# Patient Record
Sex: FEMALE | Race: WHITE | NOT HISPANIC OR LATINO
[De-identification: names, ages, dates, MRNs, and addresses within clinical notes are randomized per-mention and may not be internally consistent; named-entity substitution may affect disease eponyms.]

---

## 2020-07-07 PROBLEM — Z00.00 ENCOUNTER FOR PREVENTIVE HEALTH EXAMINATION: Status: ACTIVE | Noted: 2020-07-07

## 2020-07-10 ENCOUNTER — APPOINTMENT (OUTPATIENT)
Dept: CARDIOLOGY | Facility: CLINIC | Age: 65
End: 2020-07-10
Payer: MEDICARE

## 2020-07-10 ENCOUNTER — APPOINTMENT (OUTPATIENT)
Dept: CARDIOTHORACIC SURGERY | Facility: CLINIC | Age: 65
End: 2020-07-10
Payer: MEDICARE

## 2020-07-10 VITALS
OXYGEN SATURATION: 96 % | WEIGHT: 200 LBS | DIASTOLIC BLOOD PRESSURE: 62 MMHG | HEIGHT: 64 IN | RESPIRATION RATE: 13 BRPM | BODY MASS INDEX: 34.15 KG/M2 | SYSTOLIC BLOOD PRESSURE: 123 MMHG | TEMPERATURE: 98.8 F | HEART RATE: 97 BPM

## 2020-07-10 VITALS
SYSTOLIC BLOOD PRESSURE: 123 MMHG | BODY MASS INDEX: 34.15 KG/M2 | OXYGEN SATURATION: 96 % | HEART RATE: 97 BPM | TEMPERATURE: 98.8 F | DIASTOLIC BLOOD PRESSURE: 62 MMHG | WEIGHT: 200 LBS | HEIGHT: 64 IN | RESPIRATION RATE: 13 BRPM

## 2020-07-10 DIAGNOSIS — Z82.49 FAMILY HISTORY OF ISCHEMIC HEART DISEASE AND OTHER DISEASES OF THE CIRCULATORY SYSTEM: ICD-10-CM

## 2020-07-10 DIAGNOSIS — Z87.891 PERSONAL HISTORY OF NICOTINE DEPENDENCE: ICD-10-CM

## 2020-07-10 DIAGNOSIS — Z82.3 FAMILY HISTORY OF STROKE: ICD-10-CM

## 2020-07-10 DIAGNOSIS — Z80.8 FAMILY HISTORY OF MALIGNANT NEOPLASM OF OTHER ORGANS OR SYSTEMS: ICD-10-CM

## 2020-07-10 DIAGNOSIS — Z78.9 OTHER SPECIFIED HEALTH STATUS: ICD-10-CM

## 2020-07-10 PROCEDURE — 99204 OFFICE O/P NEW MOD 45 MIN: CPT

## 2020-07-10 NOTE — ASSESSMENT
[FreeTextEntry1] : Ms. ALIRIO GUY 65 year F no significant medical history, PSxH of cholecystecomy, CA of the lower eyelid excision, presents to our office today for evaluation of aortic stenosis referred to our office by Dr. Montemayor. Diagnostic testing reviewed. \par \par Plan: CT angio of heart with IV contrast TAVR protocol\par          PFT\par \par I, Moni Babcock, MARSHAL-BC, am acting as scribe for Dr. Milan.\par \par will determine if bicuspid or tricuspid valve.and determine candidacy for TAVR on CTA.\par

## 2020-07-10 NOTE — PHYSICAL EXAM
[Normal Appearance] : normal appearance [Well Groomed] : well groomed [General Appearance - Well Nourished] : well nourished [General Appearance - Well Developed] : well developed [No Deformities] : no deformities [General Appearance - Alert] : alert [General Appearance - In No Acute Distress] : in no acute distress [] : no respiratory distress [Heart Rate And Rhythm] : heart rate was normal and rhythm regular [Auscultation Breath Sounds / Voice Sounds] : lungs were clear to auscultation bilaterally [Systolic grade ___/6] : A grade [unfilled]/6 systolic murmur was heard. [Abnormal Walk] : normal gait [Motor Tone] : muscle strength and tone were normal [Nail Clubbing] : no clubbing  or cyanosis of the fingernails [Musculoskeletal - Swelling] : no joint swelling seen [Sensation] : the sensory exam was normal to light touch and pinprick [No Focal Deficits] : no focal deficits [Deep Tendon Reflexes (DTR)] : deep tendon reflexes were 2+ and symmetric [Affect] : the affect was normal [Impaired Insight] : insight and judgment were intact [Oriented To Time, Place, And Person] : oriented to person, place, and time

## 2020-07-10 NOTE — PHYSICAL EXAM
[General Appearance - Alert] : alert [General Appearance - In No Acute Distress] : in no acute distress [] : no respiratory distress [Auscultation Breath Sounds / Voice Sounds] : lungs were clear to auscultation bilaterally [Motor Tone] : muscle strength and tone were normal [Musculoskeletal - Swelling] : no joint swelling seen [Abnormal Walk] : normal gait [Nail Clubbing] : no clubbing  or cyanosis of the fingernails [Deep Tendon Reflexes (DTR)] : deep tendon reflexes were 2+ and symmetric [Sensation] : the sensory exam was normal to light touch and pinprick [No Focal Deficits] : no focal deficits [Impaired Insight] : insight and judgment were intact [Oriented To Time, Place, And Person] : oriented to person, place, and time [Affect] : the affect was normal [Heart Rate And Rhythm] : heart rate was normal and rhythm regular [Systolic grade ___/6] : A grade [unfilled]/6 systolic murmur was heard.

## 2020-07-17 NOTE — HISTORY OF PRESENT ILLNESS
[Dyslipidemia] : Dyslipidemia [FreeTextEntry1] : Ms. ALIRIO GUY 65 year F no significant medical history, PSxH of cholecystecomy, CA of the lower eyelid excision, presents to our office today for evaluation of aortic stenosis referred to our office by Dr. Montemayor. She endorse SOB worsening over the past 1 yr, most notably during the past 4 months. She reports the SOB is occasional, and she denies fatigue. She reports dizziness when she lays flat and uses 3 pillows to sit up to watch TV, but does not require the 3 pillows to sleep. She lives at home with her  and is independent in her ADLs. She is a retired [employer] , and most recently a retired [employer] employee. \par \par \par Her healthcare team is as follows:\par PMD: Maggi Chen\par Cardio: Heidi Montemayor\par  [Diabetes Mellitus] : no Diabetes Melllitus [Dialysis] : no dialysis [Hypertension] : no hypertension [Infectious Endocarditis] : no infectious endocarditis [Inhaled Medication Therapy] : no inhaled medication therapy [Home Oxygen] : no home oxygen use [Liver Disease] : no liver disease [Sleep Apnea] : no sleep apnea [Immunocompromise Present] : not immunocompromised [Peripheral Artery Disease] : no peripheral artery disease [Unresponsive Neurologic State] : not in a unresponsive neurologic state [Syncope] : no syncope [Cerebrovascular Disease] : no cerebrovascular disease [Prior Myocardial Infarction] : No prior myocardial infarction [CVD TIA] : no CVD Tia [Prior CVA] : with no prior CVA [Prior Heart Failure] : no prior heart failure

## 2020-07-17 NOTE — END OF VISIT
[FreeTextEntry3] : Seen / examined and above reviewed.\par \par Severe symptomatic aortic stenosis.\par Progressive exertional dyspnea.\par \par Records reviewed:\par CATH: No CAD\par ECHO: nL LVSF.  Mod AI.  Sev AS.\par \par Will obtain TAVR protocol CTA and further evaluate for TAVR.  Patient likely low-risk AVR.  If high risk anatomy on CTA, will consider surgery.  Otherwise, plan for expeditious TAVR.

## 2020-07-17 NOTE — ASSESSMENT
[FreeTextEntry1] : Ms. ALIRIO GUY 65 year F no significant medical history, PSxH of cholecystecomy, CA of the lower eyelid excision, presents to our office today for evaluation of aortic stenosis referred to our office by Dr. Montemayor. Diagnostic testing reviewed. \par \par Plan: CT angio of heart with IV contrast TAVR protocol\par          PFT\par          patient will get dental clearance with Dr. Myles\par \par \par

## 2020-07-28 ENCOUNTER — RESULT REVIEW (OUTPATIENT)
Age: 65
End: 2020-07-28

## 2020-07-28 ENCOUNTER — OUTPATIENT (OUTPATIENT)
Dept: OUTPATIENT SERVICES | Facility: HOSPITAL | Age: 65
LOS: 1 days | Discharge: HOME | End: 2020-07-28
Payer: MEDICARE

## 2020-07-28 DIAGNOSIS — I35.0 NONRHEUMATIC AORTIC (VALVE) STENOSIS: ICD-10-CM

## 2020-07-28 PROCEDURE — 75574 CT ANGIO HRT W/3D IMAGE: CPT | Mod: 26

## 2020-07-28 PROCEDURE — 93880 EXTRACRANIAL BILAT STUDY: CPT | Mod: 26

## 2020-07-28 PROCEDURE — 74174 CTA ABD&PLVS W/CONTRAST: CPT | Mod: 26

## 2020-07-29 ENCOUNTER — RESULT REVIEW (OUTPATIENT)
Age: 65
End: 2020-07-29

## 2020-07-29 ENCOUNTER — OUTPATIENT (OUTPATIENT)
Dept: OUTPATIENT SERVICES | Facility: HOSPITAL | Age: 65
LOS: 1 days | Discharge: HOME | End: 2020-07-29
Payer: MEDICARE

## 2020-07-29 ENCOUNTER — LABORATORY RESULT (OUTPATIENT)
Age: 65
End: 2020-07-29

## 2020-07-29 ENCOUNTER — OUTPATIENT (OUTPATIENT)
Dept: OUTPATIENT SERVICES | Facility: HOSPITAL | Age: 65
LOS: 1 days | Discharge: HOME | End: 2020-07-29

## 2020-07-29 VITALS
HEART RATE: 96 BPM | RESPIRATION RATE: 16 BRPM | SYSTOLIC BLOOD PRESSURE: 140 MMHG | OXYGEN SATURATION: 97 % | DIASTOLIC BLOOD PRESSURE: 83 MMHG | TEMPERATURE: 98 F | HEIGHT: 64 IN | WEIGHT: 210.98 LBS

## 2020-07-29 DIAGNOSIS — Z01.818 ENCOUNTER FOR OTHER PREPROCEDURAL EXAMINATION: ICD-10-CM

## 2020-07-29 DIAGNOSIS — I10 ESSENTIAL (PRIMARY) HYPERTENSION: ICD-10-CM

## 2020-07-29 DIAGNOSIS — Z98.890 OTHER SPECIFIED POSTPROCEDURAL STATES: Chronic | ICD-10-CM

## 2020-07-29 DIAGNOSIS — Z90.49 ACQUIRED ABSENCE OF OTHER SPECIFIED PARTS OF DIGESTIVE TRACT: Chronic | ICD-10-CM

## 2020-07-29 DIAGNOSIS — E66.9 OBESITY, UNSPECIFIED: ICD-10-CM

## 2020-07-29 DIAGNOSIS — Z11.59 ENCOUNTER FOR SCREENING FOR OTHER VIRAL DISEASES: ICD-10-CM

## 2020-07-29 DIAGNOSIS — I35.0 NONRHEUMATIC AORTIC (VALVE) STENOSIS: ICD-10-CM

## 2020-07-29 LAB
ALBUMIN SERPL ELPH-MCNC: 4.7 G/DL — SIGNIFICANT CHANGE UP (ref 3.5–5.2)
ALP SERPL-CCNC: 85 U/L — SIGNIFICANT CHANGE UP (ref 30–115)
ALT FLD-CCNC: 14 U/L — SIGNIFICANT CHANGE UP (ref 0–41)
ANION GAP SERPL CALC-SCNC: 14 MMOL/L — SIGNIFICANT CHANGE UP (ref 7–14)
APPEARANCE UR: CLEAR — SIGNIFICANT CHANGE UP
APTT BLD: 30.7 SEC — SIGNIFICANT CHANGE UP (ref 27–39.2)
AST SERPL-CCNC: 16 U/L — SIGNIFICANT CHANGE UP (ref 0–41)
BACTERIA # UR AUTO: NEGATIVE — SIGNIFICANT CHANGE UP
BASOPHILS # BLD AUTO: 0.02 K/UL — SIGNIFICANT CHANGE UP (ref 0–0.2)
BASOPHILS NFR BLD AUTO: 0.2 % — SIGNIFICANT CHANGE UP (ref 0–1)
BILIRUB SERPL-MCNC: 0.2 MG/DL — SIGNIFICANT CHANGE UP (ref 0.2–1.2)
BILIRUB UR-MCNC: NEGATIVE — SIGNIFICANT CHANGE UP
BLD GP AB SCN SERPL QL: SIGNIFICANT CHANGE UP
BUN SERPL-MCNC: 16 MG/DL — SIGNIFICANT CHANGE UP (ref 10–20)
CALCIUM SERPL-MCNC: 9.6 MG/DL — SIGNIFICANT CHANGE UP (ref 8.5–10.1)
CHLORIDE SERPL-SCNC: 101 MMOL/L — SIGNIFICANT CHANGE UP (ref 98–110)
CO2 SERPL-SCNC: 26 MMOL/L — SIGNIFICANT CHANGE UP (ref 17–32)
COLOR SPEC: SIGNIFICANT CHANGE UP
CREAT SERPL-MCNC: 1 MG/DL — SIGNIFICANT CHANGE UP (ref 0.7–1.5)
DIFF PNL FLD: ABNORMAL
EOSINOPHIL # BLD AUTO: 0.19 K/UL — SIGNIFICANT CHANGE UP (ref 0–0.7)
EOSINOPHIL NFR BLD AUTO: 2.1 % — SIGNIFICANT CHANGE UP (ref 0–8)
EPI CELLS # UR: 3 /HPF — SIGNIFICANT CHANGE UP (ref 0–5)
GLUCOSE SERPL-MCNC: 85 MG/DL — SIGNIFICANT CHANGE UP (ref 70–99)
GLUCOSE UR QL: NEGATIVE — SIGNIFICANT CHANGE UP
HCT VFR BLD CALC: 36.7 % — LOW (ref 37–47)
HGB BLD-MCNC: 11.5 G/DL — LOW (ref 12–16)
HYALINE CASTS # UR AUTO: 1 /LPF — SIGNIFICANT CHANGE UP (ref 0–7)
IMM GRANULOCYTES NFR BLD AUTO: 0.3 % — SIGNIFICANT CHANGE UP (ref 0.1–0.3)
INR BLD: 0.98 RATIO — SIGNIFICANT CHANGE UP (ref 0.65–1.3)
KETONES UR-MCNC: NEGATIVE — SIGNIFICANT CHANGE UP
LEUKOCYTE ESTERASE UR-ACNC: NEGATIVE — SIGNIFICANT CHANGE UP
LYMPHOCYTES # BLD AUTO: 1.93 K/UL — SIGNIFICANT CHANGE UP (ref 1.2–3.4)
LYMPHOCYTES # BLD AUTO: 20.8 % — SIGNIFICANT CHANGE UP (ref 20.5–51.1)
MCHC RBC-ENTMCNC: 27.6 PG — SIGNIFICANT CHANGE UP (ref 27–31)
MCHC RBC-ENTMCNC: 31.3 G/DL — LOW (ref 32–37)
MCV RBC AUTO: 88 FL — SIGNIFICANT CHANGE UP (ref 81–99)
MONOCYTES # BLD AUTO: 0.75 K/UL — HIGH (ref 0.1–0.6)
MONOCYTES NFR BLD AUTO: 8.1 % — SIGNIFICANT CHANGE UP (ref 1.7–9.3)
MRSA PCR RESULT.: NEGATIVE — SIGNIFICANT CHANGE UP
NEUTROPHILS # BLD AUTO: 6.34 K/UL — SIGNIFICANT CHANGE UP (ref 1.4–6.5)
NEUTROPHILS NFR BLD AUTO: 68.5 % — SIGNIFICANT CHANGE UP (ref 42.2–75.2)
NITRITE UR-MCNC: NEGATIVE — SIGNIFICANT CHANGE UP
NRBC # BLD: 0 /100 WBCS — SIGNIFICANT CHANGE UP (ref 0–0)
NT-PROBNP SERPL-SCNC: 605 PG/ML — HIGH (ref 0–300)
PH UR: 6 — SIGNIFICANT CHANGE UP (ref 5–8)
PLATELET # BLD AUTO: 283 K/UL — SIGNIFICANT CHANGE UP (ref 130–400)
POTASSIUM SERPL-MCNC: 4.7 MMOL/L — SIGNIFICANT CHANGE UP (ref 3.5–5)
POTASSIUM SERPL-SCNC: 4.7 MMOL/L — SIGNIFICANT CHANGE UP (ref 3.5–5)
PROT SERPL-MCNC: 7.2 G/DL — SIGNIFICANT CHANGE UP (ref 6–8)
PROT UR-MCNC: NEGATIVE — SIGNIFICANT CHANGE UP
PROTHROM AB SERPL-ACNC: 11.3 SEC — SIGNIFICANT CHANGE UP (ref 9.95–12.87)
RBC # BLD: 4.17 M/UL — LOW (ref 4.2–5.4)
RBC # FLD: 15.5 % — HIGH (ref 11.5–14.5)
RBC CASTS # UR COMP ASSIST: 12 /HPF — HIGH (ref 0–4)
SODIUM SERPL-SCNC: 141 MMOL/L — SIGNIFICANT CHANGE UP (ref 135–146)
SP GR SPEC: 1.02 — SIGNIFICANT CHANGE UP (ref 1.01–1.02)
UROBILINOGEN FLD QL: SIGNIFICANT CHANGE UP
WBC # BLD: 9.26 K/UL — SIGNIFICANT CHANGE UP (ref 4.8–10.8)
WBC # FLD AUTO: 9.26 K/UL — SIGNIFICANT CHANGE UP (ref 4.8–10.8)
WBC UR QL: 3 /HPF — SIGNIFICANT CHANGE UP (ref 0–5)

## 2020-07-29 PROCEDURE — 71046 X-RAY EXAM CHEST 2 VIEWS: CPT | Mod: 26

## 2020-07-29 PROCEDURE — 93010 ELECTROCARDIOGRAM REPORT: CPT

## 2020-07-29 NOTE — H&P PST ADULT - HISTORY OF PRESENT ILLNESS
Pt states she went in for annual visit with her cardiologist and had an echo done where it was discovered that she had a "leaky" valve. Denies any chest pain, difficulty breathing, SOB, palpitations, dysuria, URI, or any other infections in the last 2 weeks. Denies any recent travel, contact, or exposure to any persons with known or suspected COVID-19. Pt also denies COVID testing within the last 2 weeks. Pt advised to self quarantine until day of procedure. Exercise tolerance of 1 flight of stairs without dyspnea. REJI reviewed with patient.     Anesthesia Alert  YES--Difficult Airway: Class IV  NO--History of neck surgery or radiation  NO--Limited ROM of neck  NO--History of Malignant hyperthermia  NO--No personal or family history of Pseudocholinesterase deficiency.  NO--Prior Anesthesia Complication  NO--Latex Allergy  YES--Loose teeth (bottom front)  NO--History of Rheumatoid Arthritis  NO--REJI  NO--Other_____

## 2020-07-29 NOTE — H&P PST ADULT - REASON FOR ADMISSION
66 yo female presents for PAST in preparation for transcatheter aortic valve replacement, right and left cardiac catheterization, transvenous pacer intra aortic balloon pump, peripheral angiogram angioplasty, possible emergent surgery all related procedures on 8/5/2020.

## 2020-07-29 NOTE — H&P PST ADULT - ABILITY TO HEAR (WITH HEARING AID OR HEARING APPLIANCE IF NORMALLY USED):
HDL 69   Dietary changes and follow up as outpatient      Adequate: hears normal conversation without difficulty

## 2020-07-30 LAB
A1C WITH ESTIMATED AVERAGE GLUCOSE RESULT: 5.4 % — SIGNIFICANT CHANGE UP (ref 4–5.6)
ESTIMATED AVERAGE GLUCOSE: 108 MG/DL — SIGNIFICANT CHANGE UP (ref 68–114)

## 2020-07-31 ENCOUNTER — APPOINTMENT (OUTPATIENT)
Dept: CARDIOTHORACIC SURGERY | Facility: CLINIC | Age: 65
End: 2020-07-31
Payer: MEDICARE

## 2020-07-31 ENCOUNTER — OUTPATIENT (OUTPATIENT)
Dept: OUTPATIENT SERVICES | Facility: HOSPITAL | Age: 65
LOS: 1 days | Discharge: HOME | End: 2020-07-31

## 2020-07-31 ENCOUNTER — APPOINTMENT (OUTPATIENT)
Dept: CARDIOLOGY | Facility: CLINIC | Age: 65
End: 2020-07-31
Payer: MEDICARE

## 2020-07-31 VITALS
HEIGHT: 64 IN | SYSTOLIC BLOOD PRESSURE: 135 MMHG | HEART RATE: 85 BPM | OXYGEN SATURATION: 97 % | WEIGHT: 210 LBS | DIASTOLIC BLOOD PRESSURE: 56 MMHG | RESPIRATION RATE: 13 BRPM | BODY MASS INDEX: 35.85 KG/M2 | TEMPERATURE: 98.4 F

## 2020-07-31 DIAGNOSIS — Z71.2 PERSON CONSULTING FOR EXPLANATION OF EXAMINATION OR TEST FINDINGS: ICD-10-CM

## 2020-07-31 DIAGNOSIS — I35.9 NONRHEUMATIC AORTIC VALVE DISORDER, UNSPECIFIED: ICD-10-CM

## 2020-07-31 DIAGNOSIS — R06.02 SHORTNESS OF BREATH: ICD-10-CM

## 2020-07-31 DIAGNOSIS — Z90.49 ACQUIRED ABSENCE OF OTHER SPECIFIED PARTS OF DIGESTIVE TRACT: Chronic | ICD-10-CM

## 2020-07-31 DIAGNOSIS — Z98.890 OTHER SPECIFIED POSTPROCEDURAL STATES: Chronic | ICD-10-CM

## 2020-07-31 PROCEDURE — 99214 OFFICE O/P EST MOD 30 MIN: CPT

## 2020-07-31 NOTE — DATA REVIEWED
[FreeTextEntry1] : 5 meter walk - 7/10/20\par 5.28\par 6.17\par 5.45\par AV.63\par \par ============================================================================\par \par \par

## 2020-07-31 NOTE — END OF VISIT
[FreeTextEntry3] : Seen / examined and above reviewed.\par Clinically stable.\par CTA reviewed.  Tricuspid valve with favorable anatomy for TF TAVR.\par Procedure discussed.  Informed consent obtained.  Scheduled for next week.\par 25-minute encounter.

## 2020-07-31 NOTE — REASON FOR VISIT
[Follow-Up: _____] : a [unfilled] follow-up visit [Consultation] : a consultation regarding [Aortic Stenosis] : aortic stenosis [FreeTextEntry1] : Aortic stenosis procedure planning

## 2020-07-31 NOTE — HISTORY OF PRESENT ILLNESS
[Dyslipidemia] : Dyslipidemia [FreeTextEntry1] : Ms. ALIRIO GUY 65 year F no significant medical history, PSxH of cholecystecomy, CA of the lower eyelid excision, presents to our office today for evaluation of aortic stenosis referred to our office by Dr. Montemayor. She endorse SOB worsening over the past 1 yr, most notably during the past 4 months. She reports the SOB is occasional, and she denies fatigue. She reports dizziness when she lays flat and uses 3 pillows to sit up to watch TV, but does not require the 3 pillows to sleep. She lives at home with her  and is independent in her ADLs. She is a retired [employer] , and most recently a retired [employer] employee. \par \par \par Her healthcare team is as follows:\par PMD: Maggi Chen\par Cardio: Heidi Montemayor\par  [Diabetes Mellitus] : no Diabetes Melllitus [Infectious Endocarditis] : no infectious endocarditis [Dialysis] : no dialysis [Hypertension] : no hypertension [Home Oxygen] : no home oxygen use [Inhaled Medication Therapy] : no inhaled medication therapy [Liver Disease] : no liver disease [Sleep Apnea] : no sleep apnea [Peripheral Artery Disease] : no peripheral artery disease [Immunocompromise Present] : not immunocompromised [Unresponsive Neurologic State] : not in a unresponsive neurologic state [Cerebrovascular Disease] : no cerebrovascular disease [Syncope] : no syncope [Prior CVA] : with no prior CVA [CVD TIA] : no CVD Tia [Prior Myocardial Infarction] : No prior myocardial infarction [Prior Heart Failure] : no prior heart failure

## 2020-07-31 NOTE — ASSESSMENT
[FreeTextEntry1] : Ms. ALIRIO GUY 65 year F no significant medical history, PSxH of cholecystecomy, CA of the lower eyelid excision, presents to our office today for evaluation of aortic stenosis referred to our office by Dr. Montemayor. She endorse SOB worsening over the past 1 yr, most notably during the past 4 months. Diagnostic testing reviewed. \par \par Plan: Discuss with Dr. Barney for final planning\par \par I, Moni Babcock, MARY-BC, am acting as scribe for Dr. Milan.\par  will review CT w heart team and if a candidate for TAVR will proceed. explained all the risks of surgery to patient. she agrees.

## 2020-07-31 NOTE — ASSESSMENT
[FreeTextEntry1] : Ms. ALIRIO GUY 65 year F no significant medical history, PSxH of cholecystecomy, CA of the lower eyelid excision, presents to our office today for evaluation of aortic stenosis referred to our office by Dr. Montemayor. Diagnostic testing reviewed. Dental clearance obtained, and CT angio TAVR protocol completed. PFT pending completion on 7/31/20.\par \par \par         \par

## 2020-07-31 NOTE — PHYSICAL EXAM
[] : no respiratory distress [Auscultation Breath Sounds / Voice Sounds] : lungs were clear to auscultation bilaterally [Abnormal Walk] : normal gait [Musculoskeletal - Swelling] : no joint swelling seen [Nail Clubbing] : no clubbing  or cyanosis of the fingernails [Motor Tone] : muscle strength and tone were normal [Deep Tendon Reflexes (DTR)] : deep tendon reflexes were 2+ and symmetric [Sensation] : the sensory exam was normal to light touch and pinprick [No Focal Deficits] : no focal deficits [Oriented To Time, Place, And Person] : oriented to person, place, and time [Impaired Insight] : insight and judgment were intact [Affect] : the affect was normal [Systolic grade ___/6] : A grade [unfilled]/6 systolic murmur was heard.

## 2020-08-02 ENCOUNTER — LABORATORY RESULT (OUTPATIENT)
Age: 65
End: 2020-08-02

## 2020-08-02 ENCOUNTER — OUTPATIENT (OUTPATIENT)
Dept: OUTPATIENT SERVICES | Facility: HOSPITAL | Age: 65
LOS: 1 days | Discharge: HOME | End: 2020-08-02

## 2020-08-02 DIAGNOSIS — Z90.49 ACQUIRED ABSENCE OF OTHER SPECIFIED PARTS OF DIGESTIVE TRACT: Chronic | ICD-10-CM

## 2020-08-02 DIAGNOSIS — Z11.59 ENCOUNTER FOR SCREENING FOR OTHER VIRAL DISEASES: ICD-10-CM

## 2020-08-02 DIAGNOSIS — Z98.890 OTHER SPECIFIED POSTPROCEDURAL STATES: Chronic | ICD-10-CM

## 2020-08-02 PROBLEM — Z86.79 PERSONAL HISTORY OF OTHER DISEASES OF THE CIRCULATORY SYSTEM: Chronic | Status: ACTIVE | Noted: 2020-07-29

## 2020-08-04 VITALS — WEIGHT: 214.95 LBS | HEIGHT: 64 IN

## 2020-08-04 DIAGNOSIS — R09.89 OTHER SPECIFIED SYMPTOMS AND SIGNS INVOLVING THE CIRCULATORY AND RESPIRATORY SYSTEMS: ICD-10-CM

## 2020-08-04 NOTE — PRE-ANESTHESIA EVALUATION ADULT - NSRADCARDRESULTSFT_GEN_ALL_CORE
CT heart:     ANNULUS/AORTA:  Annular measurements were performed using images reconstructed during  systole - RR cycle - 30%    Annulus Area = 548 mm2  Annulus Perimeter = 86 mm  Annulus bi-plane diameter of elliptical cross section = 23 x 29 mm  Aortic root angulation with respect to axial plane: 58 degrees      The aortic valve is trileaflet.    VALVULAR CALCIFICATION:  The aortic valve is moderately calcified.  Protrusion of aortic valve calcifications into the outflow tract: No  Calcifications of the aorto-mitral continuity: No  Mitral annular calcifications: No

## 2020-08-04 NOTE — PRE-ANESTHESIA EVALUATION ADULT - NSANTHNECKRD_ENT_A_CORE
Dr. Chen's 4/27 office note that addresses cardiac clearance faxed to Yehuda Raya MD @ [facility], per daughter's request. [phone]  
No

## 2020-08-05 ENCOUNTER — APPOINTMENT (OUTPATIENT)
Dept: CARDIOTHORACIC SURGERY | Facility: HOSPITAL | Age: 65
End: 2020-08-05

## 2020-08-05 ENCOUNTER — TRANSCRIPTION ENCOUNTER (OUTPATIENT)
Age: 65
End: 2020-08-05

## 2020-08-05 ENCOUNTER — INPATIENT (INPATIENT)
Facility: HOSPITAL | Age: 65
LOS: 0 days | Discharge: HOME HEALTH | End: 2020-08-06
Attending: THORACIC SURGERY (CARDIOTHORACIC VASCULAR SURGERY) | Admitting: THORACIC SURGERY (CARDIOTHORACIC VASCULAR SURGERY)
Payer: MEDICARE

## 2020-08-05 DIAGNOSIS — Z98.890 OTHER SPECIFIED POSTPROCEDURAL STATES: Chronic | ICD-10-CM

## 2020-08-05 DIAGNOSIS — Z90.49 ACQUIRED ABSENCE OF OTHER SPECIFIED PARTS OF DIGESTIVE TRACT: Chronic | ICD-10-CM

## 2020-08-05 LAB
ABO RH CONFIRMATION: SIGNIFICANT CHANGE UP
ALBUMIN SERPL ELPH-MCNC: 3.9 G/DL — SIGNIFICANT CHANGE UP (ref 3.5–5.2)
ALP SERPL-CCNC: 74 U/L — SIGNIFICANT CHANGE UP (ref 30–115)
ALT FLD-CCNC: 12 U/L — SIGNIFICANT CHANGE UP (ref 0–41)
ANION GAP SERPL CALC-SCNC: 12 MMOL/L — SIGNIFICANT CHANGE UP (ref 7–14)
APTT BLD: 31.1 SEC — SIGNIFICANT CHANGE UP (ref 27–39.2)
AST SERPL-CCNC: 17 U/L — SIGNIFICANT CHANGE UP (ref 0–41)
BILIRUB SERPL-MCNC: 0.3 MG/DL — SIGNIFICANT CHANGE UP (ref 0.2–1.2)
BUN SERPL-MCNC: 14 MG/DL — SIGNIFICANT CHANGE UP (ref 10–20)
CALCIUM SERPL-MCNC: 7.9 MG/DL — LOW (ref 8.5–10.1)
CHLORIDE SERPL-SCNC: 103 MMOL/L — SIGNIFICANT CHANGE UP (ref 98–110)
CO2 SERPL-SCNC: 24 MMOL/L — SIGNIFICANT CHANGE UP (ref 17–32)
CREAT SERPL-MCNC: 0.8 MG/DL — SIGNIFICANT CHANGE UP (ref 0.7–1.5)
GAS PNL BLDA: SIGNIFICANT CHANGE UP
GAS PNL BLDA: SIGNIFICANT CHANGE UP
GLUCOSE SERPL-MCNC: 122 MG/DL — HIGH (ref 70–99)
HCT VFR BLD CALC: 33.8 % — LOW (ref 37–47)
HGB BLD-MCNC: 10.5 G/DL — LOW (ref 12–16)
INR BLD: 1.06 RATIO — SIGNIFICANT CHANGE UP (ref 0.65–1.3)
MAGNESIUM SERPL-MCNC: 2.1 MG/DL — SIGNIFICANT CHANGE UP (ref 1.8–2.4)
MCHC RBC-ENTMCNC: 27.2 PG — SIGNIFICANT CHANGE UP (ref 27–31)
MCHC RBC-ENTMCNC: 31.1 G/DL — LOW (ref 32–37)
MCV RBC AUTO: 87.6 FL — SIGNIFICANT CHANGE UP (ref 81–99)
NRBC # BLD: 0 /100 WBCS — SIGNIFICANT CHANGE UP (ref 0–0)
PLATELET # BLD AUTO: 221 K/UL — SIGNIFICANT CHANGE UP (ref 130–400)
POTASSIUM SERPL-MCNC: 4.6 MMOL/L — SIGNIFICANT CHANGE UP (ref 3.5–5)
POTASSIUM SERPL-SCNC: 4.6 MMOL/L — SIGNIFICANT CHANGE UP (ref 3.5–5)
PROT SERPL-MCNC: 6 G/DL — SIGNIFICANT CHANGE UP (ref 6–8)
PROTHROM AB SERPL-ACNC: 12.2 SEC — SIGNIFICANT CHANGE UP (ref 9.95–12.87)
RBC # BLD: 3.86 M/UL — LOW (ref 4.2–5.4)
RBC # FLD: 15.6 % — HIGH (ref 11.5–14.5)
SODIUM SERPL-SCNC: 139 MMOL/L — SIGNIFICANT CHANGE UP (ref 135–146)
WBC # BLD: 10.61 K/UL — SIGNIFICANT CHANGE UP (ref 4.8–10.8)
WBC # FLD AUTO: 10.61 K/UL — SIGNIFICANT CHANGE UP (ref 4.8–10.8)

## 2020-08-05 PROCEDURE — 71045 X-RAY EXAM CHEST 1 VIEW: CPT | Mod: 26

## 2020-08-05 PROCEDURE — 33361 REPLACE AORTIC VALVE PERQ: CPT | Mod: 62,Q0

## 2020-08-05 PROCEDURE — 93010 ELECTROCARDIOGRAM REPORT: CPT

## 2020-08-05 RX ORDER — ACETAMINOPHEN 500 MG
650 TABLET ORAL ONCE
Refills: 0 | Status: COMPLETED | OUTPATIENT
Start: 2020-08-05 | End: 2020-08-05

## 2020-08-05 RX ORDER — ASPIRIN/CALCIUM CARB/MAGNESIUM 324 MG
81 TABLET ORAL DAILY
Refills: 0 | Status: DISCONTINUED | OUTPATIENT
Start: 2020-08-05 | End: 2020-08-06

## 2020-08-05 RX ORDER — CEFAZOLIN SODIUM 1 G
1000 VIAL (EA) INJECTION EVERY 8 HOURS
Refills: 0 | Status: COMPLETED | OUTPATIENT
Start: 2020-08-05 | End: 2020-08-06

## 2020-08-05 RX ORDER — SODIUM CHLORIDE 9 MG/ML
1000 INJECTION INTRAMUSCULAR; INTRAVENOUS; SUBCUTANEOUS
Refills: 0 | Status: DISCONTINUED | OUTPATIENT
Start: 2020-08-05 | End: 2020-08-06

## 2020-08-05 RX ORDER — SODIUM CHLORIDE 9 MG/ML
1000 INJECTION INTRAMUSCULAR; INTRAVENOUS; SUBCUTANEOUS
Refills: 0 | Status: DISCONTINUED | OUTPATIENT
Start: 2020-08-05 | End: 2020-08-05

## 2020-08-05 RX ORDER — ASPIRIN/CALCIUM CARB/MAGNESIUM 324 MG
325 TABLET ORAL ONCE
Refills: 0 | Status: COMPLETED | OUTPATIENT
Start: 2020-08-05 | End: 2020-08-05

## 2020-08-05 RX ORDER — CHOLECALCIFEROL (VITAMIN D3) 125 MCG
12000 CAPSULE ORAL
Qty: 0 | Refills: 0 | DISCHARGE

## 2020-08-05 RX ORDER — PANTOPRAZOLE SODIUM 20 MG/1
40 TABLET, DELAYED RELEASE ORAL
Refills: 0 | Status: DISCONTINUED | OUTPATIENT
Start: 2020-08-05 | End: 2020-08-06

## 2020-08-05 RX ORDER — ATORVASTATIN CALCIUM 80 MG/1
10 TABLET, FILM COATED ORAL AT BEDTIME
Refills: 0 | Status: DISCONTINUED | OUTPATIENT
Start: 2020-08-05 | End: 2020-08-06

## 2020-08-05 RX ORDER — CLOPIDOGREL BISULFATE 75 MG/1
75 TABLET, FILM COATED ORAL DAILY
Refills: 0 | Status: DISCONTINUED | OUTPATIENT
Start: 2020-08-05 | End: 2020-08-06

## 2020-08-05 RX ORDER — ATORVASTATIN CALCIUM 80 MG/1
1 TABLET, FILM COATED ORAL
Qty: 0 | Refills: 0 | DISCHARGE

## 2020-08-05 RX ORDER — NICARDIPINE HYDROCHLORIDE 30 MG/1
5 CAPSULE, EXTENDED RELEASE ORAL
Qty: 40 | Refills: 0 | Status: DISCONTINUED | OUTPATIENT
Start: 2020-08-05 | End: 2020-08-06

## 2020-08-05 RX ORDER — ONDANSETRON 8 MG/1
4 TABLET, FILM COATED ORAL THREE TIMES A DAY
Refills: 0 | Status: DISCONTINUED | OUTPATIENT
Start: 2020-08-05 | End: 2020-08-06

## 2020-08-05 RX ORDER — CLOPIDOGREL BISULFATE 75 MG/1
300 TABLET, FILM COATED ORAL ONCE
Refills: 0 | Status: COMPLETED | OUTPATIENT
Start: 2020-08-05 | End: 2020-08-05

## 2020-08-05 RX ADMIN — ATORVASTATIN CALCIUM 10 MILLIGRAM(S): 80 TABLET, FILM COATED ORAL at 23:54

## 2020-08-05 RX ADMIN — NICARDIPINE HYDROCHLORIDE 25 MG/HR: 30 CAPSULE, EXTENDED RELEASE ORAL at 14:53

## 2020-08-05 RX ADMIN — Medication 325 MILLIGRAM(S): at 07:00

## 2020-08-05 RX ADMIN — Medication 650 MILLIGRAM(S): at 16:15

## 2020-08-05 RX ADMIN — ONDANSETRON 4 MILLIGRAM(S): 8 TABLET, FILM COATED ORAL at 13:30

## 2020-08-05 RX ADMIN — Medication 100 MILLIGRAM(S): at 18:47

## 2020-08-05 RX ADMIN — Medication 100 MILLIGRAM(S): at 11:33

## 2020-08-05 RX ADMIN — CLOPIDOGREL BISULFATE 300 MILLIGRAM(S): 75 TABLET, FILM COATED ORAL at 06:59

## 2020-08-05 RX ADMIN — Medication 650 MILLIGRAM(S): at 15:45

## 2020-08-05 NOTE — DISCHARGE NOTE PROVIDER - NSDCACTIVITY_GEN_ALL_CORE
Do not drive or operate machinery/Showering allowed/Stairs allowed/Walking - Indoors allowed/Walking - Outdoors allowed/No heavy lifting/straining

## 2020-08-05 NOTE — DISCHARGE NOTE PROVIDER - NSDCFUSCHEDAPPT_GEN_ALL_CORE_FT
ALIRIO GUY ; 08/14/2020 ; [facility] [address] ALIRIO Felix ; 09/08/2020 ; [facility] [address] [facility] ALIRIO Felix ; 09/25/2020 ; [facility] [address] ALIRIO Felix ; 09/25/2020 ; [facility] [address] ALIRIO Felix ; 09/25/2020 ; [facility] [address] ALIRIO GUY ; 08/14/2020 ; [facility] [address] ALIRIO Felix ; 09/09/2020 ; [facility] [address] [facility] ALIRIO Felix ; 09/25/2020 ; [facility] [address] ALIRIO Felix ; 09/25/2020 ; [facility] [address] ALIRIO Felix ; 09/25/2020 ; [facility] [address] ALIRIO GUY ; 08/14/2020 ; [facility] [address] ALIRIO Felix ; 09/09/2020 ; [facility] [address] [facility] ALIRIO Felix ; 09/25/2020 ; [facility] [address] ALIRIO Felix ; 09/25/2020 ; [facility] [address] ALIRIO Felix ; 09/25/2020 ; [facility] [address]

## 2020-08-05 NOTE — DISCHARGE NOTE PROVIDER - NSDCFUADDINST_GEN_ALL_CORE_FT
Activities/Restrictions  1.	Do not – drive, lift, pull or push anything over 10 pounds for 2 weeks.  2.	Shower every night and carefully wash wounds, pat dry.  Cover if wound is draining with dry sterile dressing. No baths or swimming for two months.   3.	Do progressive walking exercises every day, gradually increasing to 30 to 40 minutes/day, five days a week and incentive spirometer 10 times every hour while awake  5.	DO NOT DRIVE OR CONSUME ALCOHOL WHILE TAKING PAIN MEDICATION.  Contact your Physician promptly if:  1.	Signs of wound infection, such as increasing redness, swelling, pain or drainage from incisions.  2.	Progressive shortness of breath or increasing difficulty with breathing when lying down.  3.	Excessive nausea, vomiting, diarrhea or coughing.  4.	Increasing swelling, pain or bruising in the groins or swelling of legs that does not resolve with leg elevation.  5.	Chest pain that spreads to arms, jaw or back or sudden development of numbness, weakness, difficulty speaking or facial droop – Call 911.  6.	Diabetics who are unable to keep finger stick glucose under 150 for three consecutive readings.  Instructions:  1.	 Keep a daily log for Temperature, pulse, blood pressure, and weight twice a day and Glucose if diabetic with each meal. Call office for Temp > 101, pulse greater than 110 or less than 55, BP first # greater than 160 or less than 100, 3 pound weight gain in 1 day or 5 pounds in 3 days.

## 2020-08-05 NOTE — DISCHARGE NOTE PROVIDER - PROVIDER TOKENS
PROVIDER:[TOKEN:[[id number]]],PROVIDER:[TOKEN:[[id number]]] PROVIDER:[TOKEN:[[id number]],SCHEDULEDAPPT:[08/14/2020],SCHEDULEDAPPTTIME:[12:50 PM]],PROVIDER:[TOKEN:[[id number]],FOLLOWUP:[Routine]],PROVIDER:[TOKEN:[[id number]],SCHEDULEDAPPT:[09/08/2020],SCHEDULEDAPPTTIME:[12:00 PM]],FREE:[LAST:[Pamtis],FIRST:[Pa],PHONE:[phone]],FAX:[(   )    -],ADDRESS:[[facility]  [address] [place] [address] Ave [address] [place] [address]],SCHEDULEDAPPT:[08/14/2020],SCHEDULEDAPPTTIME:[12:50 PM]]

## 2020-08-05 NOTE — DISCHARGE NOTE PROVIDER - HOSPITAL COURSE
Ms. ALIRIO GUY 65 year F no significant medical history, PSxH of cholecystecomy, CA of the lower eyelid excision, presented to  her cardiologist  for SOB worsening over the past 1 yr, most notably during the past 4 months. Echo revealed severe aortic stenosis. On 08/05/2020, she underwent TAVR with 23mm Alvarez bioprosthetic valve.            PMD: Maggi Chen    Cardio: Heidi Montemayor Ms. ALIRIO GUY 65 year female with no significant medical history, PSxH of cholecystecomy, CA of the lower eyelid excision, presented to her cardiologist  for SOB worsening over the past 1 yr, most notably during the past 4 months. Echo revealed severe aortic stenosis. On 08/05/2020, she underwent TAVR with 23mm Avlarez bioprosthetic valve. Post-operatively patient had an uneventful hospital course and was discharged home on POD # 1 with MCOT device in place. MCOT device was placed on patient in CTU and patient instructed on proper use and care. Patient expressed understanding confirmed by teach back. Patient advised to call CT surgery clinic with any questions or concerns.

## 2020-08-05 NOTE — DISCHARGE NOTE PROVIDER - CARE PROVIDER_API CALL
Candi Milan  THORACIC AND CARDIAC SURGERY  [address] [place] [address]  Phone: [phone]  Fax: [phone]  Follow Up Time:     Nicole Dior  [facility]  [address] [place] [address]  Phone: [phone]  Fax: [phone]  Follow Up Time: Candi Milan  THORACIC AND CARDIAC SURGERY  [address] [place] [address]  Phone: [phone]  Fax: [phone]  Scheduled Appointment: 08/14/2020 12:50 PM    Nicole Dior  [facility]  [address] [place] [address]  Phone: [phone]  Fax: [phone]  Follow Up Time: Routine    Jeremias Sesay; CHAPARRITA)  Cardiac Electrophysiology  [address] [place] [address]  Phone: [phone]  Fax: [phone]  Scheduled Appointment: 09/08/2020 12:00 PM    Pa Barney  [facility]  [address]  [place] [address]  Phone: [phone]  Fax: (   )    -  Scheduled Appointment: 08/14/2020 12:50 PM

## 2020-08-05 NOTE — DISCHARGE NOTE PROVIDER - NSDCMRMEDTOKEN_GEN_ALL_CORE_FT
Lipitor 10 mg oral tablet: 1 tab(s) orally once a day  Vitamin D3 10,000 intl units (250 mcg) oral capsule: 65068  orally once a week Lipitor 10 mg oral tablet: 1 tab(s) orally once a day  Vitamin D3 10,000 intl units (250 mcg) oral capsule: 78611  orally once a week aspirin 81 mg oral tablet, chewable: 1 tab(s) orally once a day  clopidogrel 75 mg oral tablet: 1 tab(s) orally once a day  Kapspargo Sprinkle 25 mg oral capsule, extended release: 1 cap(s) orally once a day   Lipitor 10 mg oral tablet: 1 tab(s) orally once a day  pantoprazole 40 mg oral delayed release tablet: 1 tab(s) orally once a day (before a meal)  Vitamin D3 10,000 intl units (250 mcg) oral capsule: 64263  orally once a week

## 2020-08-05 NOTE — DISCHARGE NOTE PROVIDER - CARE PROVIDERS DIRECT ADDRESSES
,[email or web address],[email or web address] ,[email or web address],DirectAddress_Unknown,[email or web address],DirectAddress_Unknown

## 2020-08-05 NOTE — DISCHARGE NOTE PROVIDER - NSDCFUADDAPPT_GEN_ALL_CORE_FT
[facility]  [address] [place] [address] [address]  [place] [address]  [phone]  Friday DATE @ TIME

## 2020-08-05 NOTE — DISCHARGE NOTE PROVIDER - NSDCCPCAREPLAN_GEN_ALL_CORE_FT
PRINCIPAL DISCHARGE DIAGNOSIS  Diagnosis: S/P TAVR (transcatheter aortic valve replacement)  Assessment and Plan of Treatment:

## 2020-08-06 ENCOUNTER — TRANSCRIPTION ENCOUNTER (OUTPATIENT)
Age: 65
End: 2020-08-06

## 2020-08-06 ENCOUNTER — APPOINTMENT (OUTPATIENT)
Dept: CARDIOTHORACIC SURGERY | Facility: CLINIC | Age: 65
End: 2020-08-06

## 2020-08-06 VITALS
DIASTOLIC BLOOD PRESSURE: 59 MMHG | RESPIRATION RATE: 20 BRPM | OXYGEN SATURATION: 97 % | TEMPERATURE: 99 F | SYSTOLIC BLOOD PRESSURE: 122 MMHG | HEART RATE: 65 BPM

## 2020-08-06 DIAGNOSIS — Z02.9 ENCOUNTER FOR ADMINISTRATIVE EXAMINATIONS, UNSPECIFIED: ICD-10-CM

## 2020-08-06 LAB
ANION GAP SERPL CALC-SCNC: 12 MMOL/L — SIGNIFICANT CHANGE UP (ref 7–14)
BUN SERPL-MCNC: 15 MG/DL — SIGNIFICANT CHANGE UP (ref 10–20)
CALCIUM SERPL-MCNC: 8.7 MG/DL — SIGNIFICANT CHANGE UP (ref 8.5–10.1)
CHLORIDE SERPL-SCNC: 104 MMOL/L — SIGNIFICANT CHANGE UP (ref 98–110)
CO2 SERPL-SCNC: 25 MMOL/L — SIGNIFICANT CHANGE UP (ref 17–32)
CREAT SERPL-MCNC: 0.8 MG/DL — SIGNIFICANT CHANGE UP (ref 0.7–1.5)
GAS PNL BLDA: SIGNIFICANT CHANGE UP
GLUCOSE SERPL-MCNC: 99 MG/DL — SIGNIFICANT CHANGE UP (ref 70–99)
HCT VFR BLD CALC: 32.5 % — LOW (ref 37–47)
HGB BLD-MCNC: 10.3 G/DL — LOW (ref 12–16)
MCHC RBC-ENTMCNC: 27.8 PG — SIGNIFICANT CHANGE UP (ref 27–31)
MCHC RBC-ENTMCNC: 31.7 G/DL — LOW (ref 32–37)
MCV RBC AUTO: 87.8 FL — SIGNIFICANT CHANGE UP (ref 81–99)
NRBC # BLD: 0 /100 WBCS — SIGNIFICANT CHANGE UP (ref 0–0)
PLATELET # BLD AUTO: 231 K/UL — SIGNIFICANT CHANGE UP (ref 130–400)
POTASSIUM SERPL-MCNC: 4.3 MMOL/L — SIGNIFICANT CHANGE UP (ref 3.5–5)
POTASSIUM SERPL-SCNC: 4.3 MMOL/L — SIGNIFICANT CHANGE UP (ref 3.5–5)
RBC # BLD: 3.7 M/UL — LOW (ref 4.2–5.4)
RBC # FLD: 15.7 % — HIGH (ref 11.5–14.5)
SODIUM SERPL-SCNC: 141 MMOL/L — SIGNIFICANT CHANGE UP (ref 135–146)
WBC # BLD: 13.04 K/UL — HIGH (ref 4.8–10.8)
WBC # FLD AUTO: 13.04 K/UL — HIGH (ref 4.8–10.8)

## 2020-08-06 PROCEDURE — 99239 HOSP IP/OBS DSCHRG MGMT >30: CPT

## 2020-08-06 PROCEDURE — 93010 ELECTROCARDIOGRAM REPORT: CPT

## 2020-08-06 PROCEDURE — 93306 TTE W/DOPPLER COMPLETE: CPT | Mod: 26

## 2020-08-06 PROCEDURE — 99232 SBSQ HOSP IP/OBS MODERATE 35: CPT

## 2020-08-06 PROCEDURE — 71045 X-RAY EXAM CHEST 1 VIEW: CPT | Mod: 26

## 2020-08-06 RX ORDER — METOPROLOL TARTRATE 50 MG
1 TABLET ORAL
Qty: 30 | Refills: 0
Start: 2020-08-06 | End: 2020-09-04

## 2020-08-06 RX ORDER — METOPROLOL TARTRATE 50 MG
12.5 TABLET ORAL
Refills: 0 | Status: DISCONTINUED | OUTPATIENT
Start: 2020-08-06 | End: 2020-08-06

## 2020-08-06 RX ORDER — ASPIRIN/CALCIUM CARB/MAGNESIUM 324 MG
1 TABLET ORAL
Qty: 30 | Refills: 0
Start: 2020-08-06 | End: 2020-09-04

## 2020-08-06 RX ORDER — AMLODIPINE BESYLATE 2.5 MG/1
2.5 TABLET ORAL DAILY
Refills: 0 | Status: DISCONTINUED | OUTPATIENT
Start: 2020-08-06 | End: 2020-08-06

## 2020-08-06 RX ORDER — CLOPIDOGREL BISULFATE 75 MG/1
1 TABLET, FILM COATED ORAL
Qty: 30 | Refills: 0
Start: 2020-08-06 | End: 2020-09-04

## 2020-08-06 RX ORDER — PANTOPRAZOLE SODIUM 20 MG/1
1 TABLET, DELAYED RELEASE ORAL
Qty: 30 | Refills: 0
Start: 2020-08-06 | End: 2020-09-04

## 2020-08-06 RX ADMIN — Medication 100 MILLIGRAM(S): at 02:47

## 2020-08-06 RX ADMIN — PANTOPRAZOLE SODIUM 40 MILLIGRAM(S): 20 TABLET, DELAYED RELEASE ORAL at 06:13

## 2020-08-06 RX ADMIN — CLOPIDOGREL BISULFATE 75 MILLIGRAM(S): 75 TABLET, FILM COATED ORAL at 12:20

## 2020-08-06 RX ADMIN — Medication 81 MILLIGRAM(S): at 12:20

## 2020-08-06 RX ADMIN — Medication 12.5 MILLIGRAM(S): at 09:29

## 2020-08-06 NOTE — DISCHARGE NOTE NURSING/CASE MANAGEMENT/SOCIAL WORK - PATIENT PORTAL LINK FT
You can access the FollowMyHealth Patient Portal offered by [facility] by registering at the following website: [email or web address]. By joining [facility]’s FollowMyHealth portal, you will also be able to view your health information using other applications (apps) compatible with our system.

## 2020-08-06 NOTE — PROGRESS NOTE ADULT - SUBJECTIVE AND OBJECTIVE BOX
OPERATIVE PROCEDURE(s):                POD #                       SURGEON(s): REJI Milan  SUBJECTIVE ASSESSMENT:65yFemale patient seen and examined at bedside.    Vital Signs Last 24 Hrs  T(F): 98.1 (06 Aug 2020 04:00), Max: 98.1 (06 Aug 2020 04:00)  HR: 66 (06 Aug 2020 07:00) (61 - 96)  BP: 128/58 (05 Aug 2020 15:30) (121/56 - 133/57)  BP(mean): 83 (05 Aug 2020 15:30) (81 - 83)  ABP: 131/43 (06 Aug 2020 07:00) (115/50 - 139/50)  ABP(mean): 71 (06 Aug 2020 07:00)  RR: 17 (06 Aug 2020 07:00) (7 - 80)  SpO2: 98% (06 Aug 2020 07:00) (92% - 100%)  CVP(mm Hg): --  CVP(cm H2O): --  CO: --  CI: --  PA: --  SVR: --    I&O's Detail    05 Aug 2020 07:01  -  06 Aug 2020 07:00  --------------------------------------------------------  IN:    IV PiggyBack: 150 mL    niCARdipine Infusion: 250 mL    Oral Fluid: 1200 mL    sodium chloride 0.9%: 40 mL    sodium chloride 0.9%.: 80 mL  Total IN: 1720 mL    OUT:    Indwelling Catheter - Urethral: 3215 mL  Total OUT: 3215 mL        Net: I&O's Detail    05 Aug 2020 07:01  -  06 Aug 2020 07:00  --------------------------------------------------------  Total NET: -1495 mL        CAPILLARY BLOOD GLUCOSE  131 (05 Aug 2020 12:15)  128 (05 Aug 2020 11:15)          Physical Exam:  General: NAD; A&Ox3  Cardiac: S1/S2, RRR, no murmur, no rubs  Lungs: unlabored respirations, CTA b/l, no wheeze, no rales, no crackles  Abdomen: Soft/NT/ND; positive bowel sounds x 4  Sternum: Intact, no click, incision healing well with no drainage  Incisions: Incisions clean/dry/intact  Extremities: No edema b/l lower extremities; good capillary refill; no cyanosis; palpable 1+ pedal pulses b/l    Central Venous Catheter: Yes[]  No[] , If Yes indication:                    Day #  [place] Catheter: Yes  [] , No  [] , If yes indication:                                 Day #  NGT: Yes [] No [] ,    If Yes Placement:                                                   Day #  EPICARDIAL WIRES:  [] YES [] NO                                                            Day #  BOWEL MOVEMENT:  [] YES [] NO, If No, Timing since last BM Day #  CHEST TUBE(Left/Right):  [] YES [] NO, If yes -  AIR LEAKS:  [] YES [] NO        LABS:                        10.3<L>  13.04<H> )-----------( 231      ( 06 Aug 2020 03:26 )             32.5<L>                        10.5<L>  10.61 )-----------( 221      ( 05 Aug 2020 11:46 )             33.8<L>    08-06    141  |  104  |  15  ----------------------------<  99  4.3   |  25  |  0.8  08-05    139  |  103  |  14  ----------------------------<  122<H>  4.6   |  24  |  0.8    Ca    8.7      06 Aug 2020 03:26  Mg     2.1     08-05    TPro  6.0 [6.0 - 8.0]  /  Alb  3.9 [3.5 - 5.2]  /  TBili  0.3 [0.2 - 1.2]  /  DBili  x   /  AST  17 [0 - 41]  /  ALT  12 [0 - 41]  /  AlkPhos  74 [30 - 115]  08-05    PT/INR - ( 05 Aug 2020 11:46 )   PT: ;   INR: 1.06 ratio         PTT - ( 05 Aug 2020 11:46 )  PTT:31.1 sec    ABG - ( 06 Aug 2020 03:59 )  pH: 7.43  /  pCO2: 41    /  pO2: 58    / HCO3: 27    / Base Excess: 2.3   /  SaO2: 92    /  LA: 0.6              RADIOLOGY & ADDITIONAL TESTS:  CXR:   EKG:  Allergies    Fish Products (Short breath)  No Known Drug Allergies    Intolerances      MEDICATIONS  (STANDING):  aspirin  chewable 81 milliGRAM(s) Oral daily  atorvastatin 10 milliGRAM(s) Oral at bedtime  clopidogrel Tablet 75 milliGRAM(s) Oral daily  niCARdipine Infusion 5 mG/Hr (25 mL/Hr) IV Continuous <Continuous>  pantoprazole    Tablet 40 milliGRAM(s) Oral before breakfast  sodium chloride 0.9%. 1000 milliLiter(s) (20 mL/Hr) IV Continuous <Continuous>    MEDICATIONS  (PRN):  ondansetron Injectable 4 milliGRAM(s) IV Push three times a day PRN Nausea and/or Vomiting    Home Medications:  Lipitor 10 mg oral tablet: 1 tab(s) orally once a day (05 Aug 2020 06:36)  Vitamin D3 10,000 intl units (250 mcg) oral capsule: 27645  orally once a week (05 Aug 2020 06:36)      Pharmacologic DVT Prophylaxis: [] YES, []NO: Contraindication:   [] HEPARIN: Dose: XX mg  Q24H    [] LOVENOX: Dose: XX mg  Q24H                 SCD's: YES b/l    GI Prophylaxis: Protonix [], Pepcid []    Post-Op Beta-Blockers: []Yes, []No: contraindication:  Post-Op Nitrate: []Yes, []No: contraindication:  Post-Op Aspirin: []Yes,  []No: contraindication:  Post-Op Statin: []Yes, []No: contraindication:      Ambulation/Activity Status:    Assessment/Plan:  65y Female status-post  - Case and plan discussed with CTU Intensivist and CT Surgeon - Dr. Milan/Awa/Woodrow  - Continue CTU supportive care and ongoing plan of care as per continuing CTU rounds.    - Continue DVT/GI prophylaxis  - Incentive Spirometry 10 times an hour  - Continue to advance physical activity as tolerated and continue PT/OT as directed  1. CAD: Continue ASA, statin, BB  2. HTN:   3. A. Fib:   4. COPD/Hypoxia:   5. DM/Glucose Control:     Social Service Disposition: OPERATIVE PROCEDURE(s):                POD #                       SURGEON(s): REJI Milan  SUBJECTIVE ASSESSMENT:65yFemale patient seen and examined at bedside.    Vital Signs Last 24 Hrs  T(F): 98.1 (06 Aug 2020 04:00), Max: 98.1 (06 Aug 2020 04:00)  HR: 66 (06 Aug 2020 07:00) (61 - 96)  BP: 128/58 (05 Aug 2020 15:30) (121/56 - 133/57)  BP(mean): 83 (05 Aug 2020 15:30) (81 - 83)  ABP: 131/43 (06 Aug 2020 07:00) (115/50 - 139/50)  ABP(mean): 71 (06 Aug 2020 07:00)  RR: 17 (06 Aug 2020 07:00) (7 - 80)  SpO2: 98% (06 Aug 2020 07:00) (92% - 100%)    I&O's Detail    05 Aug 2020 07:01  -  06 Aug 2020 07:00  --------------------------------------------------------  IN:    IV PiggyBack: 150 mL    niCARdipine Infusion: 250 mL    Oral Fluid: 1200 mL    sodium chloride 0.9%: 40 mL    sodium chloride 0.9%.: 80 mL  Total IN: 1720 mL    OUT:    Indwelling Catheter - Urethral: 3215 mL  Total OUT: 3215 mL    Net: I&O's Detail    05 Aug 2020 07:01  -  06 Aug 2020 07:00  --------------------------------------------------------  Total NET: -1495 mL      CAPILLARY BLOOD GLUCOSE  131 (05 Aug 2020 12:15)  128 (05 Aug 2020 11:15)      Physical Exam:  General: NAD; A&Ox3  Cardiac: S1/S2, RRR, no murmur, no rubs  Lungs: unlabored respirations, CTA b/l, no wheeze, no rales, no crackles  Abdomen: Soft/NT/ND; positive bowel sounds x 4  Sternum: Intact, no click, incision healing well with no drainage  Incisions: Incisions clean/dry/intact  Extremities: No edema b/l lower extremities; good capillary refill; no cyanosis; palpable 1+ pedal pulses b/l    Central Venous Catheter: Yes[]  No[] , If Yes indication:                    Day #  [place] Catheter: Yes  [] , No  [] , If yes indication:                                 Day #  NGT: Yes [] No [] ,    If Yes Placement:                                                   Day #  EPICARDIAL WIRES:  [] YES [] NO                                                            Day #  BOWEL MOVEMENT:  [] YES [] NO, If No, Timing since last BM Day #  CHEST TUBE(Left/Right):  [] YES [] NO, If yes -  AIR LEAKS:  [] YES [] NO        LABS:                        10.3<L>  13.04<H> )-----------( 231      ( 06 Aug 2020 03:26 )             32.5<L>                        10.5<L>  10.61 )-----------( 221      ( 05 Aug 2020 11:46 )             33.8<L>    08-06    141  |  104  |  15  ----------------------------<  99  4.3   |  25  |  0.8  08-05    139  |  103  |  14  ----------------------------<  122<H>  4.6   |  24  |  0.8    Ca    8.7      06 Aug 2020 03:26  Mg     2.1     08-05    TPro  6.0 [6.0 - 8.0]  /  Alb  3.9 [3.5 - 5.2]  /  TBili  0.3 [0.2 - 1.2]  /  DBili  x   /  AST  17 [0 - 41]  /  ALT  12 [0 - 41]  /  AlkPhos  74 [30 - 115]  08-05    PT/INR - ( 05 Aug 2020 11:46 )   PT: ;   INR: 1.06 ratio       PTT - ( 05 Aug 2020 11:46 )  PTT:31.1 sec    ABG - ( 06 Aug 2020 03:59 )  pH: 7.43  /  pCO2: 41    /  pO2: 58    / HCO3: 27    / Base Excess: 2.3   /  SaO2: 92    /  LA: 0.6        RADIOLOGY & ADDITIONAL TESTS:  CXR:   EKG:  Allergies    Fish Products (Short breath)  No Known Drug Allergies    Intolerances      MEDICATIONS  (STANDING):  aspirin  chewable 81 milliGRAM(s) Oral daily  atorvastatin 10 milliGRAM(s) Oral at bedtime  clopidogrel Tablet 75 milliGRAM(s) Oral daily  niCARdipine Infusion 5 mG/Hr (25 mL/Hr) IV Continuous <Continuous>  pantoprazole    Tablet 40 milliGRAM(s) Oral before breakfast  sodium chloride 0.9%. 1000 milliLiter(s) (20 mL/Hr) IV Continuous <Continuous>    MEDICATIONS  (PRN):  ondansetron Injectable 4 milliGRAM(s) IV Push three times a day PRN Nausea and/or Vomiting    Home Medications:  Lipitor 10 mg oral tablet: 1 tab(s) orally once a day (05 Aug 2020 06:36)  Vitamin D3 10,000 intl units (250 mcg) oral capsule: 67905  orally once a week (05 Aug 2020 06:36)      Pharmacologic DVT Prophylaxis: [] YES, []NO: Contraindication:   [] HEPARIN: Dose: XX mg  Q24H    [] LOVENOX: Dose: XX mg  Q24H                 SCD's: YES b/l    GI Prophylaxis: Protonix [], Pepcid []    Post-Op Beta-Blockers: []Yes, []No: contraindication:  Post-Op Nitrate: []Yes, []No: contraindication:  Post-Op Aspirin: []Yes,  []No: contraindication:  Post-Op Statin: []Yes, []No: contraindication:      Ambulation/Activity Status:    Assessment/Plan:  65y Female status-post  - Case and plan discussed with CTU Intensivist and CT Surgeon - Dr. Milan/Awa/Woodrow  - Continue CTU supportive care and ongoing plan of care as per continuing CTU rounds.    - Continue DVT/GI prophylaxis  - Incentive Spirometry 10 times an hour  - Continue to advance physical activity as tolerated and continue PT/OT as directed  1. CAD: Continue ASA, statin, BB  2. HTN:   3. A. Fib:   4. COPD/Hypoxia:   5. DM/Glucose Control:     Social Service Disposition: OPERATIVE PROCEDURE(s):    TAVR            POD #    1                   SURGEON(s): REJI Milan  SUBJECTIVE ASSESSMENT: 65y Female patient seen and examined at bedside. Patient denies any acute complaints at this time.     Vital Signs Last 24 Hrs  T(F): 98.1 (06 Aug 2020 04:00), Max: 98.1 (06 Aug 2020 04:00)  HR: 66 (06 Aug 2020 07:00) (61 - 96)  BP: 128/58 (05 Aug 2020 15:30) (121/56 - 133/57)  BP(mean): 83 (05 Aug 2020 15:30) (81 - 83)  ABP: 131/43 (06 Aug 2020 07:00) (115/50 - 139/50)  ABP(mean): 71 (06 Aug 2020 07:00)  RR: 17 (06 Aug 2020 07:00) (7 - 80)  SpO2: 98% (06 Aug 2020 07:00) (92% - 100%)    I&O's Detail    05 Aug 2020 07:01  -  06 Aug 2020 07:00  --------------------------------------------------------  IN:    IV PiggyBack: 150 mL    niCARdipine Infusion: 250 mL    Oral Fluid: 1200 mL    sodium chloride 0.9%: 40 mL    sodium chloride 0.9%.: 80 mL  Total IN: 1720 mL    OUT:    Indwelling Catheter - Urethral: 3215 mL  Total OUT: 3215 mL    Net: I&O's Detail    05 Aug 2020 07:01  -  06 Aug 2020 07:00  --------------------------------------------------------  Total NET: -1495 mL      CAPILLARY BLOOD GLUCOSE  131 (05 Aug 2020 12:15)  128 (05 Aug 2020 11:15)      Physical Exam:  General: NAD; A&Ox3  Cardiac: S1/S2, RRR, no murmur, no rubs  Lungs: unlabored respirations, CTA b/l, no wheeze, no rales, no crackles  Abdomen: Soft/NT/ND; positive bowel sounds x 4  Incisions: Incisions clean/dry/intact  Extremities: No edema b/l lower extremities; good capillary refill; no cyanosis; palpable 1+ pedal pulses b/l       LABS:                        10.3<L>  13.04<H> )-----------( 231      ( 06 Aug 2020 03:26 )             32.5<L>                        10.5<L>  10.61 )-----------( 221      ( 05 Aug 2020 11:46 )             33.8<L>    08-06    141  |  104  |  15  ----------------------------<  99  4.3   |  25  |  0.8  08-05    139  |  103  |  14  ----------------------------<  122<H>  4.6   |  24  |  0.8    Ca    8.7      06 Aug 2020 03:26  Mg     2.1     08-05    TPro  6.0 [6.0 - 8.0]  /  Alb  3.9 [3.5 - 5.2]  /  TBili  0.3 [0.2 - 1.2]  /  DBili  x   /  AST  17 [0 - 41]  /  ALT  12 [0 - 41]  /  AlkPhos  74 [30 - 115]  08-05    PT/INR - ( 05 Aug 2020 11:46 )   PT: ;   INR: 1.06 ratio       PTT - ( 05 Aug 2020 11:46 )  PTT:31.1 sec    ABG - ( 06 Aug 2020 03:59 )  pH: 7.43  /  pCO2: 41    /  pO2: 58    / HCO3: 27    / Base Excess: 2.3   /  SaO2: 92    /  LA: 0.6        Allergies    Fish Products (Short breath)  No Known Drug Allergies    Intolerances      MEDICATIONS  (STANDING):  aspirin  chewable 81 milliGRAM(s) Oral daily  atorvastatin 10 milliGRAM(s) Oral at bedtime  clopidogrel Tablet 75 milliGRAM(s) Oral daily  niCARdipine Infusion 5 mG/Hr (25 mL/Hr) IV Continuous <Continuous>  pantoprazole    Tablet 40 milliGRAM(s) Oral before breakfast  sodium chloride 0.9%. 1000 milliLiter(s) (20 mL/Hr) IV Continuous <Continuous>    MEDICATIONS  (PRN):  ondansetron Injectable 4 milliGRAM(s) IV Push three times a day PRN Nausea and/or Vomiting    Home Medications:  Lipitor 10 mg oral tablet: 1 tab(s) orally once a day (05 Aug 2020 06:36)  Vitamin D3 10,000 intl units (250 mcg) oral capsule: 54687  orally once a week (05 Aug 2020 06:36)      Assessment/Plan:  65y Female status-post  TAVR            POD #    1  - Case and plan discussed with CTU Intensivist and CT Surgeon - Dr. Milan/Ector   - Continue CTU supportive care and ongoing plan of care as per continuing CTU rounds.    - Continue DVT/GI prophylaxis  - Incentive Spirometry 10 times an hour  - Continue to advance physical activity as tolerated and continue PT/OT as directed  1. S/p TAVR: Cont ASA, plavix, statin, BB  2. HTN: Troplol xl 25 mg po qd.  3. D/C home with MCOT device in place.

## 2020-08-06 NOTE — PROGRESS NOTE ADULT - SUBJECTIVE AND OBJECTIVE BOX
CTU Attending Progress Daily Note     06 Aug 2020 10:10  Admited 08-05-20, Hospital Day 1d  POD# - 1    HPI: AS    Home Medications:  Lipitor 10 mg oral tablet: 1 tab(s) orally once a day (05 Aug 2020 06:36)  Vitamin D3 10,000 intl units (250 mcg) oral capsule: 10668  orally once a week (05 Aug 2020 06:36)    FAMILY HISTORY:  No pertinent family history in first degree relatives    PAST MEDICAL & SURGICAL HISTORY:  H/O aortic valve disorder  H/O eye surgery  H/O arthroscopy  S/P cholecystectomy    Interval event for past 24 hr:  ALIRIO GUY  65y had no event.   Current Complains:  ALIRIO GUY has no new complains  Allergies    Fish Products (Short breath)  No Known Drug Allergies    Intolerances      OBJECTIVE:  Vitals last 24 hrs  T(C): 36.7 (08-06-20 @ 04:00), Max: 36.7 (08-06-20 @ 04:00)  T(F): 98.1 (08-06-20 @ 04:00), Max: 98.1 (08-06-20 @ 04:00)  HR: 66 (08-06-20 @ 07:00) (61 - 96)  BP: 128/58 (08-05-20 @ 15:30) (121/56 - 133/57)  ABP: 131/43 (08-06-20 @ 07:00) (115/50 - 139/50)  ABP(mean): 71 (08-06-20 @ 07:00) (67 - 84)  RR: 17 (08-06-20 @ 07:00) (7 - 80)  SpO2: 98% (08-06-20 @ 07:00) (92% - 100%)      08-05-20 @ 07:01  -  08-06-20 @ 07:00  --------------------------------------------------------  IN:    IV PiggyBack: 150 mL    niCARdipine Infusion: 250 mL    Oral Fluid: 1200 mL    sodium chloride 0.9%: 40 mL    sodium chloride 0.9%.: 80 mL  Total IN: 1720 mL    OUT:    Indwelling Catheter - Urethral: 3215 mL  Total OUT: 3215 mL    Total NET: -1495 mL          CAPILLARY BLOOD GLUCOSE  131 (05 Aug 2020 12:15)  128 (05 Aug 2020 11:15)        LABS:  ABG - ( 06 Aug 2020 03:59 )  pH, Arterial: 7.43  pH, Blood: x     /  pCO2: 41    /  pO2: 58    / HCO3: 27    / Base Excess: 2.3   /  SaO2: 92              Blood Gas Arterial, Lactate: 0.6 mmoL/L (08-06-20 @ 03:59)  Blood Gas Arterial, Lactate: 0.6 mmoL/L (08-05-20 @ 12:07)                          10.3   13.04 )-----------( 231      ( 06 Aug 2020 03:26 )             32.5     Hemoglobin: 10.3 g/dL (08-06 @ 03:26)  Hemoglobin: 10.5 g/dL (08-05 @ 11:46)    08-06    141  |  104  |  15  ----------------------------<  99  4.3   |  25  |  0.8    Ca    8.7      06 Aug 2020 03:26  Mg     2.1     08-05    TPro  6.0  /  Alb  3.9  /  TBili  0.3  /  DBili  x   /  AST  17  /  ALT  12  /  AlkPhos  74  08-05    Creatinine, Serum: 0.8 mg/dL (08-06 @ 03:26)  Creatinine, Serum: 0.8 mg/dL (08-05 @ 11:46)    PT/INR - ( 05 Aug 2020 11:46 )   PT: 12.20 sec;   INR: 1.06 ratio     PTT - ( 05 Aug 2020 11:46 )  PTT:31.1 sec      HOSPITAL MEDICATIONS:  MEDICATIONS  (STANDING):  aspirin  chewable 81 milliGRAM(s) Oral daily  atorvastatin 10 milliGRAM(s) Oral at bedtime  clopidogrel Tablet 75 milliGRAM(s) Oral daily  metoprolol tartrate 12.5 milliGRAM(s) Oral two times a day  niCARdipine Infusion 5 mG/Hr (25 mL/Hr) IV Continuous <Continuous>  pantoprazole    Tablet 40 milliGRAM(s) Oral before breakfast  sodium chloride 0.9%. 1000 milliLiter(s) (20 mL/Hr) IV Continuous <Continuous>    MEDICATIONS  (PRN):  ondansetron Injectable 4 milliGRAM(s) IV Push three times a day PRN Nausea and/or Vomiting      REVIEW OF SYSTEMS:  CONSTITUTIONAL: [X] all negative; [ ] weakness, [ ] fevers, [ ] chills  EYES/ENT: [X] all negative; [ ] visual changes, [ ] vertigo, [ ] throat pain, [ ] eye pain  NECK: [X] all negative; [ ] pain, [ ] stiffness  RESPIRATORY: [ ] all negative, [ ] cough, [ ] wheezing, [ ] hemoptysis, [ ] shortness of breath, [  ] chest pain  CARDIOVASCULAR: [ ] all negative; [ ] anginal chest pain, [ ] palpitations, [ ] orthopnea  GASTROINTESTINAL: [X] all negative; [ ]abdominal pain, [ ] nausea, [ ] vomiting, [ ] hematemesis, [ ] diarrhea, [ ] constipation, [ ] melena, [ ] hematochezia.  GENITOURINARY: [X] all negative; [ ] dysuria, [ ] frequency, [ ] hematuria  NEUROLOGICAL: [X] all negative; [ ] numbness, [ ] weakness, [ ] paresthesias  MUSCULOSKELETAL: [X] all negative, [ ] joint pain, [ ] joint swelling, [ ] joint redness, [ ] bone pain  SKIN: [X] all negative; [ ] itching, [ ] burning, [ ] rashes, [ ] lesions   All other review of systems is negative unless indicated above.    [  ] Unable to assess ROS because     PHYSICAL EXAM:          CONSTITUTIONAL: Well-developed; well-nourished; in no acute distress.   	SKIN: warm, dry, no rashes or lesions  	HEENT: Atraumatic. Normocephalic. PERRL. Moist membranes, no conjunctival injection, sclera clear  	NECK: Supple; non tender.  No JVD. No lymphadenopathy.  	CARD: Normal S1, S2. Rate and Rhythm are regular. No murmurs.  	RESP: Good air entry bilaterally, no wheezes, no rales no rhonchi.  	ABD: Soft, not tender, not distended, no CVA tendernass, no rebound no guarding, bowel sounds present  	EXT: Normal ROM.  No clubbing, no cyanosis, no pedal edema, no calf pain b/l, Peripheral pulses intact.  	LYMPH: No acute cervical adenopathy.  	NEURO: Alert, awake, motor 5/5 R, 5/5 L, sensation intact bilat, CN 2-12 intact,          PSYCH: Cooperative, appropriate. Alert & oriented x 3    RADIOLOGY:  X Reviewed and interpreted by me  CxR from 08-06-20 shows mild congestion, no pneumothorax, no effusion, no cardiomegaly,     ECG:  X Reviewed and interpreted by me - NSR 0 74, QTc - 455

## 2020-08-06 NOTE — DISCHARGE NOTE NURSING/CASE MANAGEMENT/SOCIAL WORK - NSDCFUADDAPPT_GEN_ALL_CORE_FT
[facility]  [address] [place] [address] [address]  [place] [address]  [phone]  Friday DATE @ TIME

## 2020-08-14 ENCOUNTER — APPOINTMENT (OUTPATIENT)
Dept: CARDIOLOGY | Facility: CLINIC | Age: 65
End: 2020-08-14
Payer: MEDICARE

## 2020-08-14 VITALS
RESPIRATION RATE: 16 BRPM | OXYGEN SATURATION: 95 % | DIASTOLIC BLOOD PRESSURE: 58 MMHG | HEART RATE: 63 BPM | SYSTOLIC BLOOD PRESSURE: 141 MMHG

## 2020-08-14 PROCEDURE — 99213 OFFICE O/P EST LOW 20 MIN: CPT

## 2020-08-20 DIAGNOSIS — I10 ESSENTIAL (PRIMARY) HYPERTENSION: ICD-10-CM

## 2020-08-20 DIAGNOSIS — Z85.828 PERSONAL HISTORY OF OTHER MALIGNANT NEOPLASM OF SKIN: ICD-10-CM

## 2020-08-20 DIAGNOSIS — E66.9 OBESITY, UNSPECIFIED: ICD-10-CM

## 2020-08-20 DIAGNOSIS — Z87.891 PERSONAL HISTORY OF NICOTINE DEPENDENCE: ICD-10-CM

## 2020-08-20 DIAGNOSIS — I35.0 NONRHEUMATIC AORTIC (VALVE) STENOSIS: ICD-10-CM

## 2020-08-20 DIAGNOSIS — Z90.49 ACQUIRED ABSENCE OF OTHER SPECIFIED PARTS OF DIGESTIVE TRACT: ICD-10-CM

## 2020-09-08 ENCOUNTER — APPOINTMENT (OUTPATIENT)
Dept: CARDIOLOGY | Facility: CLINIC | Age: 65
End: 2020-09-08

## 2020-09-09 ENCOUNTER — APPOINTMENT (OUTPATIENT)
Dept: CARDIOLOGY | Facility: CLINIC | Age: 65
End: 2020-09-09
Payer: MEDICARE

## 2020-09-09 VITALS
HEIGHT: 64 IN | BODY MASS INDEX: 34.15 KG/M2 | SYSTOLIC BLOOD PRESSURE: 120 MMHG | HEART RATE: 70 BPM | WEIGHT: 200 LBS | TEMPERATURE: 97.7 F | DIASTOLIC BLOOD PRESSURE: 81 MMHG

## 2020-09-09 DIAGNOSIS — I47.1 SUPRAVENTRICULAR TACHYCARDIA: ICD-10-CM

## 2020-09-09 DIAGNOSIS — I47.2 VENTRICULAR TACHYCARDIA: ICD-10-CM

## 2020-09-09 DIAGNOSIS — Z86.79 PERSONAL HISTORY OF OTHER DISEASES OF THE CIRCULATORY SYSTEM: ICD-10-CM

## 2020-09-09 DIAGNOSIS — I49.1 ATRIAL PREMATURE DEPOLARIZATION: ICD-10-CM

## 2020-09-09 PROCEDURE — 93228 REMOTE 30 DAY ECG REV/REPORT: CPT

## 2020-09-09 PROCEDURE — 93000 ELECTROCARDIOGRAM COMPLETE: CPT | Mod: 59

## 2020-09-09 PROCEDURE — 99213 OFFICE O/P EST LOW 20 MIN: CPT

## 2020-09-09 RX ORDER — ASPIRIN 81 MG
81 TABLET, DELAYED RELEASE (ENTERIC COATED) ORAL DAILY
Refills: 0 | Status: ACTIVE | COMMUNITY

## 2020-09-09 RX ORDER — ATORVASTATIN CALCIUM 10 MG/1
10 TABLET, FILM COATED ORAL DAILY
Refills: 0 | Status: ACTIVE | COMMUNITY

## 2020-09-09 NOTE — HISTORY OF PRESENT ILLNESS
[FreeTextEntry1] : \par Cardiologist: Dr. Heidi Montemayor\par \par 64 yo F with history of aortic stenosis s/p TAVR 8/5/20 here to review results of event monitor. She feels significantly better after TAVR and states she can get around much better and has much more energy. The patient denies any cardiovascular complaints including chest pain, dyspnea, palpitations, dizziness, lightheadedness, presyncope or syncope.\par

## 2020-09-09 NOTE — REASON FOR VISIT
[Follow-Up - From Hospitalization] : follow-up of a recent hospitalization for [Discharge Date: ___] : Discharge Date: [unfilled] [FreeTextEntry1] : TAVR here for follow up of event monitor

## 2020-09-09 NOTE — ASSESSMENT
[FreeTextEntry1] : Ms. Salgado presents for routine follow up visit to review results of her event monitor post TAVR. She denies any cardiovascular complaints. Event monitor showed frequent PACs (5% burden) and several runs of nonsustained SVT (longest 22 beats). She also had one episode of 6 beats of NSVT. Recent cath in July that showed angiographically normal coronary arteries. \par \par At this time she is not on any BB so I have recommended that she restart metoprolol. However, patient would like to wait to see her cardiologist and discuss this with her before restarting. \par \par Follow up as needed. \par \par I have also advised the patient to go to the nearest emergency room if she experiences any chest pain, dyspnea, syncope, or has any other compelling symptoms.\par

## 2020-09-09 NOTE — PHYSICAL EXAM
[Normal Appearance] : normal appearance [General Appearance - Well Developed] : well developed [Well Groomed] : well groomed [No Deformities] : no deformities [General Appearance - Well Nourished] : well nourished [General Appearance - In No Acute Distress] : no acute distress [Normal Conjunctiva] : the conjunctiva exhibited no abnormalities [Respiration, Rhythm And Depth] : normal respiratory rhythm and effort [Exaggerated Use Of Accessory Muscles For Inspiration] : no accessory muscle use [Auscultation Breath Sounds / Voice Sounds] : lungs were clear to auscultation bilaterally [Heart Rate And Rhythm] : heart rate and rhythm were normal [Heart Sounds] : normal S1 and S2 [Murmurs] : no murmurs present [Edema] : no peripheral edema present [Nail Clubbing] : no clubbing of the fingernails [Abnormal Walk] : normal gait [Abdomen Soft] : soft [Skin Color & Pigmentation] : normal skin color and pigmentation [Cyanosis, Localized] : no localized cyanosis [] : no rash [Oriented To Time, Place, And Person] : oriented to person, place, and time [No Anxiety] : not feeling anxious [FreeTextEntry1] : No JVD

## 2020-09-25 ENCOUNTER — APPOINTMENT (OUTPATIENT)
Dept: CARDIOLOGY | Facility: CLINIC | Age: 65
End: 2020-09-25
Payer: MEDICARE

## 2020-09-25 PROCEDURE — 93306 TTE W/DOPPLER COMPLETE: CPT

## 2020-09-25 PROCEDURE — 93000 ELECTROCARDIOGRAM COMPLETE: CPT

## 2020-09-25 PROCEDURE — ZZZZZ: CPT

## 2020-09-25 PROCEDURE — 99213 OFFICE O/P EST LOW 20 MIN: CPT

## 2020-09-26 ENCOUNTER — RESULT CHARGE (OUTPATIENT)
Age: 65
End: 2020-09-26

## 2020-09-30 LAB
ANION GAP SERPL CALC-SCNC: 11 MMOL/L
BUN SERPL-MCNC: 16 MG/DL
CALCIUM SERPL-MCNC: 9.8 MG/DL
CHLORIDE SERPL-SCNC: 106 MMOL/L
CO2 SERPL-SCNC: 25 MMOL/L
CREAT SERPL-MCNC: 0.9 MG/DL
GLUCOSE SERPL-MCNC: 89 MG/DL
POTASSIUM SERPL-SCNC: 4.9 MMOL/L
SODIUM SERPL-SCNC: 142 MMOL/L

## 2020-10-11 NOTE — DISCUSSION/SUMMARY
[FreeTextEntry1] : Cont ASA\par Stop Plavix 1-month post procedure.\par Cardiac Rx / follow-up per Dr. Montemayor.  Beta-blocker reasonable.\par Follow-up / studies 1-year.

## 2020-10-11 NOTE — REASON FOR VISIT
[Follow-Up - Clinic] : a clinic follow-up of [Aortic Stenosis] : aortic stenosis [FreeTextEntry1] : Doing well post-TAVR\par \par Symptomatic improvement.  Breathing comfortable.\par \par No chest pain.  No palpitations, lightheadedness, syncope.\par \par Frequent PAC's / brief SVT on monitor.  Dr. Loera recommended beta-blocker.  Wish to discuss with her cardiologist.\par \par ECHO reviewed:  nL LVSF.  nL THV function. [de-identified] : S/P TAVR, 1 month F/U [de-identified] : 08/05/20 [de-identified] : 9 [de-identified] : 64 y/o female  s/p TAVR 8/2020, here for 1 month F/U

## 2020-10-23 LAB
BASOPHILS # BLD AUTO: 0.04 K/UL
BASOPHILS NFR BLD AUTO: 0.4 %
EOSINOPHIL # BLD AUTO: 0.24 K/UL
EOSINOPHIL NFR BLD AUTO: 2.5 %
HCT VFR BLD CALC: 38.1 %
HGB BLD-MCNC: 11.8 G/DL
IMM GRANULOCYTES NFR BLD AUTO: 0.3 %
LYMPHOCYTES # BLD AUTO: 1.54 K/UL
LYMPHOCYTES NFR BLD AUTO: 16.2 %
MAN DIFF?: NORMAL
MCHC RBC-ENTMCNC: 26.9 PG
MCHC RBC-ENTMCNC: 31 G/DL
MCV RBC AUTO: 87 FL
MONOCYTES # BLD AUTO: 0.68 K/UL
MONOCYTES NFR BLD AUTO: 7.2 %
NEUTROPHILS # BLD AUTO: 6.95 K/UL
NEUTROPHILS NFR BLD AUTO: 73.4 %
PLATELET # BLD AUTO: 245 K/UL
RBC # BLD: 4.38 M/UL
RBC # FLD: 14.6 %
WBC # FLD AUTO: 9.48 K/UL

## 2020-10-26 LAB
ANION GAP SERPL CALC-SCNC: 13 MMOL/L
BUN SERPL-MCNC: 16 MG/DL
CALCIUM SERPL-MCNC: 9.4 MG/DL
CHLORIDE SERPL-SCNC: 106 MMOL/L
CO2 SERPL-SCNC: 24 MMOL/L
CREAT SERPL-MCNC: 0.9 MG/DL
GLUCOSE SERPL-MCNC: 91 MG/DL
POTASSIUM SERPL-SCNC: 4.9 MMOL/L
SODIUM SERPL-SCNC: 143 MMOL/L

## 2021-08-05 ENCOUNTER — APPOINTMENT (OUTPATIENT)
Dept: CARDIOLOGY | Facility: CLINIC | Age: 66
End: 2021-08-05

## 2021-09-08 ENCOUNTER — RESULT CHARGE (OUTPATIENT)
Age: 66
End: 2021-09-08

## 2021-09-08 ENCOUNTER — APPOINTMENT (OUTPATIENT)
Dept: CARDIOLOGY | Facility: CLINIC | Age: 66
End: 2021-09-08
Payer: MEDICARE

## 2021-09-08 PROCEDURE — 93000 ELECTROCARDIOGRAM COMPLETE: CPT

## 2021-09-08 PROCEDURE — 93306 TTE W/DOPPLER COMPLETE: CPT

## 2021-09-09 ENCOUNTER — NON-APPOINTMENT (OUTPATIENT)
Age: 66
End: 2021-09-09

## 2021-09-09 ENCOUNTER — APPOINTMENT (OUTPATIENT)
Dept: CARDIOLOGY | Facility: CLINIC | Age: 66
End: 2021-09-09
Payer: MEDICARE

## 2021-09-09 VITALS
TEMPERATURE: 98.7 F | BODY MASS INDEX: 34.15 KG/M2 | SYSTOLIC BLOOD PRESSURE: 128 MMHG | DIASTOLIC BLOOD PRESSURE: 65 MMHG | HEART RATE: 89 BPM | OXYGEN SATURATION: 96 % | HEIGHT: 64 IN | WEIGHT: 200 LBS | RESPIRATION RATE: 14 BRPM

## 2021-09-09 DIAGNOSIS — Z95.2 PRESENCE OF PROSTHETIC HEART VALVE: ICD-10-CM

## 2021-09-09 PROCEDURE — 99213 OFFICE O/P EST LOW 20 MIN: CPT

## 2021-09-09 RX ORDER — METOPROLOL TARTRATE 50 MG/1
50 TABLET, FILM COATED ORAL
Refills: 0 | Status: ACTIVE | COMMUNITY

## 2021-09-12 PROBLEM — Z95.2 S/P TAVR (TRANSCATHETER AORTIC VALVE REPLACEMENT): Status: ACTIVE | Noted: 2020-08-14

## 2021-09-12 NOTE — PHYSICAL EXAM
[] : no respiratory distress [Respiration, Rhythm And Depth] : normal respiratory rhythm and effort [Heart Rate And Rhythm] : heart rate was normal and rhythm regular [Involuntary Movements] : no involuntary movements were seen [Bowel Sounds] : normal bowel sounds [Skin Color & Pigmentation] : normal skin color and pigmentation [No Focal Deficits] : no focal deficits [Oriented To Time, Place, And Person] : oriented to person, place, and time [Impaired Insight] : insight and judgment were intact

## 2021-09-12 NOTE — PHYSICAL EXAM
[] : no respiratory distress [Respiration, Rhythm And Depth] : normal respiratory rhythm and effort [Heart Rate And Rhythm] : heart rate was normal and rhythm regular [Bowel Sounds] : normal bowel sounds [Involuntary Movements] : no involuntary movements were seen [Skin Color & Pigmentation] : normal skin color and pigmentation [No Focal Deficits] : no focal deficits [Oriented To Time, Place, And Person] : oriented to person, place, and time [Impaired Insight] : insight and judgment were intact

## 2021-09-13 NOTE — ASSESSMENT
[FreeTextEntry1] : Ms. ALIRIO GUY is a 66 year F that arrives today for a post op visit. Patient is status post TAVR on 8/5/2021 via transfemoral approach with 23mm Alvarez bioprosthetic valve. Patient arrives to the office for the 1 year visit. On arrival patient denies fever, chills nausea, and vomiting. Denies SOB or palpitation. Patient states that they feel improvement in breathing and activity post procedure. \par \par Pt lives home no aid\par NYHA class I\par \par PMD: [facility] [address] in [place], Dr. Clayton \par Cardio: E[facility]isto \par \par Doing well, offers no complaints \par Follows with cardio, Dr. Loera, seen recently\par \par Continue to f/u with cardio and PMD for routine medical management and continued care. \par F/U Structural Heart PRN.

## 2021-09-13 NOTE — END OF VISIT
[FreeTextEntry3] : Seen / examined and above reviewed.\par Doing well.  Breathing comfortable.\par BP controlled.\par ECHO: nL LVSF and THV function.\par \par - ASA\par - Cardiac Rx / follow-up per Dr. Montemayor.\par - Follow-up Valve Clinic prn.

## 2021-09-13 NOTE — HISTORY OF PRESENT ILLNESS
[FreeTextEntry1] : Ms. ALIRIO GUY 65 year female with PMHx of cholecystectomy, CA of the lower eyelid excision, presented to her cardiologist for SOB worsening over the past 1 yr, Echo revealed severe aortic stenosis. On 08/05/2020, she underwent TAVR with 23mm Alvarez bioprosthetic valve. Post-operatively patient had an uneventful \par hospital course. Arrives today for 1 year follow up.  \par

## 2022-01-25 NOTE — HISTORY OF PRESENT ILLNESS
Patent called and her child threw her Birth control away, can she get another one.  Also can she get a RX for yeast infection.  Please call    nadine Kelley on 1/25/2022 at 12:05 PM     [FreeTextEntry1] : Ms. ALIRIO GUY 65 year F no significant medical history, PSxH of cholecystecomy, CA of the lower eyelid excision, presents to our office today for evaluation of aortic stenosis referred to our office by Dr. Montemayor. She endorse SOB worsening over the past 1 yr, most notably during the past 4 months. She reports the SOB is occasional, and she denies fatigue. She reports dizziness when she lays flat and uses 3 pillows to sit up to watch TV, but does not require the 3 pillows to sleep, though she notes that if she is completely reclined she becomes nauseous. She lives at home with her  and is independent in her ADLs. She is a retired [employer] , and most recently a retired [employer] employee. \par \par \par Her healthcare team is as follows:\par PMD: Maggi Chen\par Cardio: Heidi Montemayor\par \par